# Patient Record
Sex: MALE | Race: OTHER
[De-identification: names, ages, dates, MRNs, and addresses within clinical notes are randomized per-mention and may not be internally consistent; named-entity substitution may affect disease eponyms.]

---

## 2021-01-17 ENCOUNTER — HOSPITAL ENCOUNTER (EMERGENCY)
Dept: HOSPITAL 72 - EMR | Age: 26
Discharge: HOME | End: 2021-01-17
Payer: COMMERCIAL

## 2021-01-17 VITALS — DIASTOLIC BLOOD PRESSURE: 68 MMHG | SYSTOLIC BLOOD PRESSURE: 114 MMHG

## 2021-01-17 VITALS — BODY MASS INDEX: 20.3 KG/M2 | WEIGHT: 145 LBS | HEIGHT: 71 IN

## 2021-01-17 VITALS — SYSTOLIC BLOOD PRESSURE: 114 MMHG | DIASTOLIC BLOOD PRESSURE: 68 MMHG

## 2021-01-17 DIAGNOSIS — R07.89: ICD-10-CM

## 2021-01-17 DIAGNOSIS — R19.7: ICD-10-CM

## 2021-01-17 DIAGNOSIS — Z86.16: ICD-10-CM

## 2021-01-17 DIAGNOSIS — R11.2: Primary | ICD-10-CM

## 2021-01-17 LAB
ADD MANUAL DIFF: YES
ALBUMIN SERPL-MCNC: 4.9 G/DL (ref 3.4–5)
ALBUMIN/GLOB SERPL: 1.4 {RATIO} (ref 1–2.7)
ALP SERPL-CCNC: 56 U/L (ref 46–116)
ALT SERPL-CCNC: 36 U/L (ref 12–78)
ANION GAP SERPL CALC-SCNC: 14 MMOL/L (ref 5–15)
APPEARANCE UR: CLEAR
APTT BLD: 26 SEC (ref 23–33)
APTT PPP: YELLOW S
AST SERPL-CCNC: 36 U/L (ref 15–37)
BILIRUB SERPL-MCNC: 0.8 MG/DL (ref 0.2–1)
BUN SERPL-MCNC: 21 MG/DL (ref 7–18)
CALCIUM SERPL-MCNC: 9.9 MG/DL (ref 8.5–10.1)
CHLORIDE SERPL-SCNC: 102 MMOL/L (ref 98–107)
CK SERPL-CCNC: 194 U/L (ref 26–308)
CO2 SERPL-SCNC: 23 MMOL/L (ref 21–32)
CREAT SERPL-MCNC: 0.8 MG/DL (ref 0.55–1.3)
ERYTHROCYTE [DISTWIDTH] IN BLOOD BY AUTOMATED COUNT: 11.4 % (ref 11.6–14.8)
GLOBULIN SER-MCNC: 3.5 G/DL
GLUCOSE UR STRIP-MCNC: NEGATIVE MG/DL
HCT VFR BLD CALC: 46.5 % (ref 42–52)
HGB BLD-MCNC: 15.2 G/DL (ref 14.2–18)
INR PPP: 1 (ref 0.9–1.1)
KETONES UR QL STRIP: (no result)
LEUKOCYTE ESTERASE UR QL STRIP: NEGATIVE
MCV RBC AUTO: 92 FL (ref 80–99)
NITRITE UR QL STRIP: NEGATIVE
PH UR STRIP: 5 [PH] (ref 4.5–8)
PLATELET # BLD: 348 K/UL (ref 150–450)
POTASSIUM SERPL-SCNC: 4 MMOL/L (ref 3.5–5.1)
PROT UR QL STRIP: (no result)
RBC # BLD AUTO: 5.07 M/UL (ref 4.7–6.1)
SODIUM SERPL-SCNC: 139 MMOL/L (ref 136–145)
SP GR UR STRIP: 1.03 (ref 1–1.03)
UROBILINOGEN UR-MCNC: NORMAL MG/DL (ref 0–1)
WBC # BLD AUTO: 10.4 K/UL (ref 4.8–10.8)

## 2021-01-17 PROCEDURE — 96361 HYDRATE IV INFUSION ADD-ON: CPT

## 2021-01-17 PROCEDURE — 96375 TX/PRO/DX INJ NEW DRUG ADDON: CPT

## 2021-01-17 PROCEDURE — 85610 PROTHROMBIN TIME: CPT

## 2021-01-17 PROCEDURE — 82550 ASSAY OF CK (CPK): CPT

## 2021-01-17 PROCEDURE — 83690 ASSAY OF LIPASE: CPT

## 2021-01-17 PROCEDURE — 80053 COMPREHEN METABOLIC PANEL: CPT

## 2021-01-17 PROCEDURE — 71045 X-RAY EXAM CHEST 1 VIEW: CPT

## 2021-01-17 PROCEDURE — 85025 COMPLETE CBC W/AUTO DIFF WBC: CPT

## 2021-01-17 PROCEDURE — 96374 THER/PROPH/DIAG INJ IV PUSH: CPT

## 2021-01-17 PROCEDURE — 99284 EMERGENCY DEPT VISIT MOD MDM: CPT

## 2021-01-17 PROCEDURE — 85007 BL SMEAR W/DIFF WBC COUNT: CPT

## 2021-01-17 PROCEDURE — 36415 COLL VENOUS BLD VENIPUNCTURE: CPT

## 2021-01-17 PROCEDURE — 85730 THROMBOPLASTIN TIME PARTIAL: CPT

## 2021-01-17 PROCEDURE — 81003 URINALYSIS AUTO W/O SCOPE: CPT

## 2021-01-17 PROCEDURE — 84484 ASSAY OF TROPONIN QUANT: CPT

## 2021-01-17 NOTE — EMERGENCY ROOM REPORT
History of Present Illness


General


Chief Complaint:  Vomiting


Source:  Patient





Present Illness


HPI


Patient presents with 2 to 3 h of nausea and vomiting.  He also had some 

diarrhea.  He has had chills with the vomiting but no documented fever.  He is 

also had some chest pain.  He rates the pain in his chest 4/10 and somewhat 

aching.  It does not radiate.  He drank some alcohol last night and thinks this 

might be related.  He most recently vomited yellow material.  There were no 

coffee grounds or blood.  He denies any melena.





Patient tested positive for COVID-19 several weeks ago.  He has isolated himself

for 2 weeks.  Recently he had a negative Covid test.





No sore throat, palpitations, dysuria, abdominal pain, shortness of breath, 

joint pain, rashes, depression, anxiety, visual changes, dizziness, headache.


Allergies:  


Coded Allergies:  


     No Known Allergies (Unverified , 1/17/21)





COVID-19 Screening


Contact w/high risk pt:  No


Experienced COVID-19 symptoms?:  No


COVID-19 Testing performed PTA:  No





Patient History


Past Medical History:  see triage record, other - S/P COVID


Social History:  Reports: smoking, alcohol use


Social History Narrative


Has been self isolating


Reviewed Nursing Documentation:  PMH: Agreed; PSxH: Agreed





Nursing Documentation-PMH


Past Medical History:  No Stated History





Review of Systems


All Other Systems:  negative except mentioned in HPI





Physical Exam





Vital Signs








  Date Time  Temp Pulse Resp B/P (MAP) Pulse Ox O2 Delivery O2 Flow Rate FiO2


 


1/17/21 10:52 97.9 94 17 114/68 (83) 98 Room Air  








Sp02 EP Interpretation:  reviewed, normal


General Appearance:  well appearing, no apparent distress, GCS 15


Head:  normocephalic


Eyes:  bilateral eye normal inspection, bilateral eye PERRL, bilateral eye EOMI


ENT:  moist mucus membranes


Neck:  supple


Respiratory:  chest non-tender, lungs clear, normal breath sounds


Cardiovascular #1:  regular rate, rhythm


Cardiovascular #2:  2+ radial (R)


Gastrointestinal:  normal inspection, normal bowel sounds, non tender, no mass, 

non-distended


Musculoskeletal:  back normal, normal range of motion, gait/station normal


Neurologic:  alert, oriented x3, grossly normal


Psychiatric:  mood/affect normal


Skin:  no rash, warm/dry





Medical Decision Making


Diagnostic Impression:  


   Primary Impression:  


   Nausea & vomiting


   Qualified Codes:  R11.2 - Nausea with vomiting, unspecified


   Additional Impression:  


   Chest pain


   Qualified Codes:  R07.89 - Other chest pain


ER Course


Patient presents with nausea vomiting diarrhea with chest pain.  Differential 

includes gastritis, gastroenteritis, pericarditis amongst others.  Evaluation 

with EKG, chest x-ray and labs.  Patient treated with IV hydration, Zofran and 

Pepcid.  Patient placed on a cardiac monitor.





EKG without injury.  Chest x-ray normal.  Normal white count.  Slight left 

shift.  CMP remarkable for elevated BUN.  Troponin negative.





Patient with continued nausea.  Phenergan administered orally.  Patient 

tolerating oral intake without difficulty.





No apparent emergency at this time.





Discussed findings with patient.  Discussed treatment plan with patient.





Patient stable for outpatient observation and treatment.





Laboratory Tests








Test


 1/17/21


11:32


 


White Blood Count


 10.4 K/UL


(4.8-10.8)


 


Red Blood Count


 5.07 M/UL


(4.70-6.10)


 


Hemoglobin


 15.2 G/DL


(14.2-18.0)


 


Hematocrit


 46.5 %


(42.0-52.0)


 


Mean Corpuscular Volume 92 FL (80-99)  


 


Mean Corpuscular Hemoglobin


 30.0 PG


(27.0-31.0)


 


Mean Corpuscular Hemoglobin


Concent 32.6 G/DL


(32.0-36.0)


 


Red Cell Distribution Width


 11.4 %


(11.6-14.8)  L


 


Platelet Count


 348 K/UL


(150-450)


 


Mean Platelet Volume


 6.4 FL


(6.5-10.1)  L


 


Neutrophils (%) (Auto)


 % (45.0-75.0)





 


Lymphocytes (%) (Auto)


 % (20.0-45.0)





 


Monocytes (%) (Auto)  % (1.0-10.0)  


 


Eosinophils (%) (Auto)  % (0.0-3.0)  


 


Basophils (%) (Auto)  % (0.0-2.0)  


 


Differential Total Cells


Counted 100  





 


Neutrophils % (Manual) 89 % (45-75)  H


 


Lymphocytes % (Manual) 8 % (20-45)  L


 


Monocytes % (Manual) 3 % (1-10)  


 


Eosinophils % (Manual) 0 % (0-3)  


 


Basophils % (Manual) 0 % (0-2)  


 


Band Neutrophils 0 % (0-8)  


 


Platelet Estimate Adequate  


 


Platelet Morphology Normal  


 


Red Blood Cell Morphology Normal  


 


Prothrombin Time


 11.3 SEC


(9.30-11.50)


 


Prothrombin Time INR 1.0 (0.9-1.1)  


 


Activated Partial


Thromboplast Time 26 SEC (23-33)





 


Urine Color Yellow  


 


Urine Appearance Clear  


 


Urine pH 5 (4.5-8.0)  


 


Urine Specific Gravity


 1.030


(1.005-1.035)


 


Urine Protein


 1+ (NEGATIVE)


H


 


Urine Glucose (UA)


 Negative


(NEGATIVE)


 


Urine Ketones


 4+ (NEGATIVE)


H


 


Urine Blood


 2+ (NEGATIVE)


H


 


Urine Nitrite


 Negative


(NEGATIVE)


 


Urine Bilirubin


 Negative


(NEGATIVE)


 


Urine Urobilinogen


 Normal MG/DL


(0.0-1.0)


 


Urine Leukocyte Esterase


 Negative


(NEGATIVE)


 


Urine RBC


 0-2 /HPF (0 -


0)  H


 


Urine WBC


 0-2 /HPF (0 -


0)


 


Urine Squamous Epithelial


Cells Occasional


/LPF


 


Urine Bacteria


 Few /HPF


(NONE)


 


Urine Mucus


 Few /LPF


(NONE/OCC)  H


 


Sodium Level


 139 MMOL/L


(136-145)


 


Potassium Level


 4.0 MMOL/L


(3.5-5.1)


 


Chloride Level


 102 MMOL/L


()


 


Carbon Dioxide Level


 23 MMOL/L


(21-32)


 


Anion Gap


 14 mmol/L


(5-15)


 


Blood Urea Nitrogen


 21 mg/dL


(7-18)  H


 


Creatinine


 0.8 MG/DL


(0.55-1.30)


 


Estimated Glomerular


Filtration Rate > 60 mL/min


(>60)


 


Glucose Level


 67 MG/DL


()  L


 


Calcium Level


 9.9 MG/DL


(8.5-10.1)


 


Total Bilirubin


 0.8 MG/DL


(0.2-1.0)


 


Aspartate Amino Transferase


(AST) 36 U/L (15-37)





 


Alanine Aminotransferase (ALT)


 36 U/L (12-78)





 


Alkaline Phosphatase


 56 U/L


()


 


Total Creatine Kinase


 194 U/L


()


 


Troponin I


 0.000 ng/mL


(0.000-0.056)


 


Total Protein


 8.4 G/DL


(6.4-8.2)  H


 


Albumin


 4.9 G/DL


(3.4-5.0)


 


Globulin 3.5 g/dL  


 


Albumin/Globulin Ratio 1.4 (1.0-2.7)  


 


Lipase


 75 U/L


()








EKG Diagnostic Results


Rate:  normal


Rhythm:  NSR


ST Segments:  no acute changes - slight peaked T waves





Rhythm Strip Diag. Results


EP Interpretation:  yes


Rhythm:  NSR, no PVC's, no ectopy





Chest X-Ray Diagnostic Results


Chest X-Ray Diagnostic Results :  


   Chest X-Ray Ordered:  Yes


   # of Views/Limited/Complete:  1 View


   Indication:  Chest Pain


   EP Interpretation:  Yes


   Interpretation:  no consolidation, no effusion, no pneumothorax


   Impression:  No acute disease


   Electronically Signed by:  Electronically signed by Jorge Lemons MD





Last Vital Signs








  Date Time  Temp Pulse Resp B/P (MAP) Pulse Ox O2 Delivery O2 Flow Rate FiO2


 


1/17/21 13:10 97.9  17 114/68 98 Room Air  


 


1/17/21 11:00  94      








Status:  improved


Disposition:  HOME, SELF-CARE


Condition:  Improved


Scripts


Ondansetron Odt* (ZOFRAN ODT*) 4 Mg Tab.rapdis


4 MG BC EVERY 8 HOURS, #6 TAB 0 Refills


   Prov: Jorge Lemons MD         1/17/21 


Famotidine* (Pepcid 20mg tablet*) 20 Mg Tablet


20 MG ORAL DAILY for Gerd, #20 TAB 0 Refills


   Prov: Jorge Lemons MD         1/17/21


Referrals:  


[facility],REFERR[facility] (PCP)











Jorge Lemons MD                Jan 17, 2021 11:17

## 2021-01-17 NOTE — DIAGNOSTIC IMAGING REPORT
EXAM:

  XR Chest, 1 View

 

CLINICAL HISTORY:

  CP

 

TECHNIQUE:

  Frontal view of the chest.

 

COMPARISON:

  None

 

FINDINGS:

  Hardware: None.

 

  Lungs/pleura: Normal. No focal consolidation. No pleural effusion or 

pneumothorax.

 

  Heart/mediastinum: Normal. No cardiomegaly.

 

  Soft tissues: Unremarkable.

 

  Bones: No acute fracture.

 

  Upper abdomen: Normal.

 

IMPRESSION:   

  No acute disease identified.

## 2021-01-17 NOTE — NUR
ED Nurse Note:



pt presents to ED c/o vomiting onset this AM. pt reports having wine last PM, 
states that this has never happened previously when he has had wine. pt denies 
diarrhea or hematemesis, denies urinary symptoms at this time. pt also reports 
epigastric pain and that his "stomach feels weird." vital signs are noted to be 
stable, pt is ambulatory with steady gait; no c/o SOB or dyspnea